# Patient Record
Sex: FEMALE | Race: WHITE | NOT HISPANIC OR LATINO
[De-identification: names, ages, dates, MRNs, and addresses within clinical notes are randomized per-mention and may not be internally consistent; named-entity substitution may affect disease eponyms.]

---

## 2017-02-24 PROCEDURE — 88175 CYTOPATH C/V AUTO FLUID REDO: CPT | Performed by: OBSTETRICS & GYNECOLOGY

## 2017-02-24 PROCEDURE — 87624 HPV HI-RISK TYP POOLED RSLT: CPT | Performed by: OBSTETRICS & GYNECOLOGY

## 2017-05-10 ENCOUNTER — HOSPITAL (OUTPATIENT)
Dept: OTHER | Age: 57
End: 2017-05-10
Attending: OPHTHALMOLOGY

## 2017-06-16 ENCOUNTER — HOSPITAL (OUTPATIENT)
Dept: OTHER | Age: 57
End: 2017-06-16
Attending: OPHTHALMOLOGY

## 2017-08-23 PROBLEM — H59.032 CYSTOID MACULAR EDEMA OF LEFT EYE FOLLOWING CATARACT SURGERY: Status: ACTIVE | Noted: 2017-08-23

## 2017-08-23 PROBLEM — Z96.1 PSEUDOPHAKIA: Status: ACTIVE | Noted: 2017-08-23

## 2018-05-24 PROBLEM — M25.561 ACUTE PAIN OF RIGHT KNEE: Status: ACTIVE | Noted: 2018-05-24

## 2019-06-06 PROBLEM — M25.561 ACUTE PAIN OF RIGHT KNEE: Status: RESOLVED | Noted: 2018-05-24 | Resolved: 2019-06-06

## 2019-06-06 PROBLEM — F33.41 RECURRENT MAJOR DEPRESSIVE DISORDER, IN PARTIAL REMISSION (HCC): Status: ACTIVE | Noted: 2019-06-06

## 2019-06-06 PROBLEM — G47.00 INSOMNIA, UNSPECIFIED TYPE: Status: ACTIVE | Noted: 2019-06-06

## 2019-06-06 PROBLEM — F41.9 ANXIETY: Status: ACTIVE | Noted: 2019-06-06

## 2019-09-30 ENCOUNTER — HOSPITAL ENCOUNTER (EMERGENCY)
Facility: HOSPITAL | Age: 59
Discharge: HOME | End: 2019-09-30
Attending: EMERGENCY MEDICINE
Payer: COMMERCIAL

## 2019-09-30 ENCOUNTER — APPOINTMENT (OUTPATIENT)
Dept: CT IMAGING | Facility: HOSPITAL | Age: 59
End: 2019-09-30
Attending: EMERGENCY MEDICINE
Payer: COMMERCIAL

## 2019-09-30 VITALS
BODY MASS INDEX: 34.1 KG/M2 | RESPIRATION RATE: 19 BRPM | HEART RATE: 91 BPM | DIASTOLIC BLOOD PRESSURE: 65 MMHG | TEMPERATURE: 98 F | SYSTOLIC BLOOD PRESSURE: 110 MMHG | WEIGHT: 225 LBS | OXYGEN SATURATION: 97 % | HEIGHT: 68 IN

## 2019-09-30 DIAGNOSIS — J01.90 ACUTE SINUSITIS, RECURRENCE NOT SPECIFIED, UNSPECIFIED LOCATION: Primary | ICD-10-CM

## 2019-09-30 PROCEDURE — 85025 COMPLETE CBC W/AUTO DIFF WBC: CPT | Performed by: EMERGENCY MEDICINE

## 2019-09-30 PROCEDURE — 80053 COMPREHEN METABOLIC PANEL: CPT | Performed by: EMERGENCY MEDICINE

## 2019-09-30 PROCEDURE — 82962 GLUCOSE BLOOD TEST: CPT

## 2019-09-30 PROCEDURE — 93005 ELECTROCARDIOGRAM TRACING: CPT

## 2019-09-30 PROCEDURE — 36415 COLL VENOUS BLD VENIPUNCTURE: CPT

## 2019-09-30 PROCEDURE — 70450 CT HEAD/BRAIN W/O DYE: CPT | Performed by: EMERGENCY MEDICINE

## 2019-09-30 PROCEDURE — 93010 ELECTROCARDIOGRAM REPORT: CPT

## 2019-09-30 PROCEDURE — 99285 EMERGENCY DEPT VISIT HI MDM: CPT

## 2019-09-30 PROCEDURE — 99284 EMERGENCY DEPT VISIT MOD MDM: CPT

## 2019-09-30 RX ORDER — AMOXICILLIN AND CLAVULANATE POTASSIUM 875; 125 MG/1; MG/1
1 TABLET, FILM COATED ORAL 2 TIMES DAILY
Qty: 20 TABLET | Refills: 0 | Status: SHIPPED | OUTPATIENT
Start: 2019-09-30 | End: 2019-10-10

## 2019-09-30 NOTE — ED NOTES
Patient returned from CT, reconnected to monitor. Patient refuses blood pressure at this time because she reports that it hurts too much; removed cuff off her arm when attempting to obtain BP, states \"I'm just wasting everyone's time by being here\".

## 2019-09-30 NOTE — ED INITIAL ASSESSMENT (HPI)
Pt c/o toothache to her left side x 3 days, awoke this morning with left sided facial droop. Went to IC and sent to ER. Pt c/o HA x 3 days, relieved with tylenol.

## 2019-09-30 NOTE — ED PROVIDER NOTES
Patient Seen in: [facility] Emergency Department      History   Patient presents with:  Stroke (neurologic)  Fatigue (constitutional, neurologic)  Ear Problem Pain (neurosensory)    Stated Complaint: woke with left facial droop;  Fatigue all yesterda Alcohol use: Yes      Alcohol/week: 2.0 standard drinks      Types: 2 Standard drinks or equivalent per week      Frequency: Monthly or less      Drinks per session: 1 or 2      Comment: occasional    Drug use:  Yes             Review of Systems    Positive Potassium 3.4 (*)     Albumin 3.3 (*)     All other components within normal limits   POCT GLUCOSE - Abnormal; Notable for the following components:    POC Glucose 124 (*)     All other components within normal limits   CBC WITH DIFFERENTIAL WITH PLATELET Disp-20 tablet, R-0

## 2019-09-30 NOTE — ED NOTES
Patient updated with plan of care. Head of stretcher lowered, lights dimmed, so that patient may rest while waiting for radiology. Patient denies additional needs at this time.

## 2021-01-20 PROBLEM — T50.905S ADVERSE EFFECT OF UNSPECIFIED DRUGS, MEDICAMENTS AND BIOLOGICAL SUBSTANCES, SEQUELA: Status: ACTIVE | Noted: 2021-01-20

## 2021-01-20 PROBLEM — F33.1 MODERATE RECURRENT MAJOR DEPRESSION (HCC): Status: ACTIVE | Noted: 2021-01-20

## 2021-01-20 PROBLEM — F33.0 MILD RECURRENT MAJOR DEPRESSION (HCC): Status: ACTIVE | Noted: 2021-01-20

## 2021-01-20 PROBLEM — F41.1 GENERALIZED ANXIETY DISORDER: Status: ACTIVE | Noted: 2021-01-20

## 2024-02-17 ENCOUNTER — OFFICE VISIT (OUTPATIENT)
Dept: FAMILY MEDICINE CLINIC | Facility: CLINIC | Age: 64
End: 2024-02-17
Payer: COMMERCIAL

## 2024-02-17 VITALS
HEART RATE: 85 BPM | BODY MASS INDEX: 28.19 KG/M2 | OXYGEN SATURATION: 97 % | HEIGHT: 68 IN | RESPIRATION RATE: 16 BRPM | TEMPERATURE: 98 F | WEIGHT: 186 LBS | SYSTOLIC BLOOD PRESSURE: 122 MMHG | DIASTOLIC BLOOD PRESSURE: 75 MMHG

## 2024-02-17 DIAGNOSIS — T23.122A SUPERFICIAL BURN OF FINGER OF LEFT HAND, INITIAL ENCOUNTER: Primary | ICD-10-CM

## 2024-02-17 PROCEDURE — 3008F BODY MASS INDEX DOCD: CPT | Performed by: NURSE PRACTITIONER

## 2024-02-17 PROCEDURE — 3074F SYST BP LT 130 MM HG: CPT | Performed by: NURSE PRACTITIONER

## 2024-02-17 PROCEDURE — 3078F DIAST BP <80 MM HG: CPT | Performed by: NURSE PRACTITIONER

## 2024-02-17 PROCEDURE — 99203 OFFICE O/P NEW LOW 30 MIN: CPT | Performed by: NURSE PRACTITIONER

## 2024-02-17 RX ORDER — CEPHALEXIN 500 MG/1
500 CAPSULE ORAL 3 TIMES DAILY
Qty: 21 CAPSULE | Refills: 0 | Status: SHIPPED | OUTPATIENT
Start: 2024-02-17 | End: 2024-02-24

## 2024-02-17 RX ORDER — MUPIROCIN CALCIUM 20 MG/G
1 CREAM TOPICAL 2 TIMES DAILY
Qty: 15 G | Refills: 0 | Status: SHIPPED | OUTPATIENT
Start: 2024-02-17 | End: 2024-03-02

## 2024-02-17 NOTE — PROGRESS NOTES
CHIEF COMPLAINT:     Chief Complaint   Patient presents with    Burn     Pt burned finger on oven on Monday, fourth finger on left hand        HPI:     Sivan Ellis is a 63 year old female who presents with concerns of burn to left finger. Patient first noticed symptoms 5 days ago.  Reports erythema, increased warmth, some tenderness to palpation, and no drainage from area.  Reports burned left 4th finger on oven, small blister formed and scabbed, pt. Picked scab and today seems a bit worse.  Mild redness/swelling around scab which has brown in size. No drainage.  No spreading edema of finger or fevers  Symptoms have been worsening since onset.  .  Treatments: none.  No other associated symptoms.  Denies fever, streaking of wound, or other signs of systemic illness.      Current Outpatient Medications   Medication Sig Dispense Refill    cephalexin (KEFLEX) 500 MG Oral Cap Take 1 capsule (500 mg total) by mouth 3 (three) times daily for 7 days. 21 capsule 0    mupirocin 2 % External Cream Apply 1 each topically 2 (two) times daily for 14 days. 15 g 0    buPROPion 300 MG Oral Tablet 24 Hr Take 1 tablet (300 mg total) by mouth daily. 30 tablet 2    lamoTRIgine (LAMICTAL) 25 MG Oral Tab Take 1 tablet (25 mg total) by mouth 2 (two) times daily. 60 tablet 2    MELOXICAM 15 MG Oral Tab TAKE 1 TABLET(15 MG) BY MOUTH DAILY 90 tablet 0    MYRBETRIQ 50 MG Oral Tablet 24 Hr TAKE 1 TABLET BY MOUTH DAILY 90 tablet 3    zolpidem 10 MG Oral Tab Take 1 tablet (10 mg total) by mouth nightly as needed for Sleep. 30 tablet 2    Vitamin D3, Cholecalciferol, 125 MCG (5000 UT) Oral Cap Take 1 capsule (5,000 Units total) by mouth daily.      ascorbic acid 250 MG Oral Tab Take 1 tablet (250 mg total) by mouth daily.      B Complex Vitamins (VITAMIN B COMPLEX) Oral Tab Take  by mouth daily.      Lisdexamfetamine Dimesylate (VYVANSE) 70 MG Oral Cap Take 1 capsule (70 mg total) by mouth every morning. 30 capsule 0    Clobetasol Propionate 0.05  % External Ointment Apply to the affected rash areas on body, twice daily for 1 week, then if still present, reduce to applying once daily, until resolved. (Patient not taking: Reported on 2/17/2024) 45 g 0      Past Medical History:   Diagnosis Date    Cervical high risk HPV (human papillomavirus) test positive 4/2015    Choledocholithiasis 2012    ANGELITA I (cervical intraepithelial neoplasia I) 5/2015    Colon adenomas 2011    DEPRESSION     Depressive disorder, not elsewhere classified     Other and unspecified hyperlipidemia     Sleep apnea       Social History:  Social History     Socioeconomic History    Marital status:     Number of children: 1   Occupational History    Occupation: (unemployed)1   Tobacco Use    Smoking status: Never    Smokeless tobacco: Never   Vaping Use    Vaping Use: Never used   Substance and Sexual Activity    Alcohol use: Yes     Alcohol/week: 2.0 standard drinks of alcohol     Types: 2 Standard drinks or equivalent per week     Comment: occasional    Drug use: Yes    Sexual activity: Yes     Partners: Male   Other Topics Concern     Service No    Blood Transfusions No    Sleep Concern No    Exercise Yes     Comment: walking    Seat Belt Yes        REVIEW OF SYSTEMS:   GENERAL: feels well otherwise, no fever, no chills.  SKIN: as above.  CHEST: no chest pains, no palpitations.  LUNGS: denies shortness of breath with exertion or rest. No wheezing, no cough.  LYMPH: denies enlargement of the lymph nodes.  MUSC/SKEL: no joint swelling, no joint stiffness.  CARDIOVASCULAR: denies chest pain on exertion or rest.  GI: no nausea, no vomiting or abdominal pain  NEURO: no abnormal sensation, no tingling of the skin or numbness.    EXAM:   /75   Pulse 85   Temp 98 °F (36.7 °C) (Oral)   Resp 16   Ht 5' 8\" (1.727 m)   Wt 186 lb (84.4 kg)   LMP 03/19/2012   SpO2 97%   BMI 28.28 kg/m²   GENERAL: well developed, well nourished,in no apparent distress  SKIN:  Left 4th finger, dorsal aspect with approx 1cm papule with scabbing to center near PIP joint.  Mild 2-4mm edema/erythema. No fluctuance.   HEAD: atraumatic, normocephalic  NECK: supple, non-tender  LUNGS: clear to auscultation bilaterally, no wheezes or rhonchi. Breathing is non labored.  CARDIO: RRR without murmur  EXTREMITIES: no cyanosis, clubbing or edema.  Cap refill brisk- less than 2 seconds.       ASSESSMENT AND PLAN:     ASSESSMENT:   Encounter Diagnosis   Name Primary?    Superficial burn of finger of left hand, initial encounter Yes       PLAN:   Pt. Leaving for [facility] this afternoon. Advised like only needs topical abx ointment and keep covered, if worsening over next several days, ok to take oral abx. Skin care discussed with patient. Instructions and Comfort Care as listed in Patient Instructions.  Medication as below.    Requested Prescriptions     Signed Prescriptions Disp Refills    cephalexin (KEFLEX) 500 MG Oral Cap 21 capsule 0     Sig: Take 1 capsule (500 mg total) by mouth 3 (three) times daily for 7 days.    mupirocin 2 % External Cream 15 g 0     Sig: Apply 1 each topically 2 (two) times daily for 14 days.       There are no Patient Instructions on file for this visit.    The patient indicates understanding of these issues and agrees to the plan.  The patient is asked to return in 3 days if sx's persist or worsen.

## (undated) NOTE — ED AVS SNAPSHOT
Mariana Howell   MRN: [id number]    Department:  [facility] Emergency Department   Date of Visit:  9/30/2019           Disclosure     Insurance plans vary and the physician(s) referred by the ER may not be covered by your plan.  Please contact your i tell this physician (or your personal doctor if your instructions are to return to your personal doctor) about any new or lasting problems. The primary care or specialist physician will see patients referred from the [facility] Emergency Department.  Nalini Gimenez